# Patient Record
Sex: FEMALE | Race: WHITE | NOT HISPANIC OR LATINO | Employment: FULL TIME | RURAL
[De-identification: names, ages, dates, MRNs, and addresses within clinical notes are randomized per-mention and may not be internally consistent; named-entity substitution may affect disease eponyms.]

---

## 2019-11-05 ENCOUNTER — HISTORICAL (OUTPATIENT)
Dept: ADMINISTRATIVE | Facility: HOSPITAL | Age: 24
End: 2019-11-05

## 2019-11-06 LAB
LAB AP CLINICAL INFORMATION: NORMAL
LAB AP COMMENTS: NORMAL
LAB AP GENERAL CAT - HISTORICAL: NORMAL
LAB AP INTERPRETATION/RESULT - HISTORICAL: NEGATIVE
LAB AP SPECIMEN ADEQUACY - HISTORICAL: NORMAL
LAB AP SPECIMEN SUBMITTED - HISTORICAL: NORMAL

## 2022-06-28 ENCOUNTER — OFFICE VISIT (OUTPATIENT)
Dept: OBSTETRICS AND GYNECOLOGY | Facility: CLINIC | Age: 27
End: 2022-06-28
Payer: COMMERCIAL

## 2022-06-28 VITALS
SYSTOLIC BLOOD PRESSURE: 134 MMHG | DIASTOLIC BLOOD PRESSURE: 86 MMHG | WEIGHT: 187.81 LBS | HEART RATE: 80 BPM | HEIGHT: 64 IN | BODY MASS INDEX: 32.06 KG/M2 | OXYGEN SATURATION: 98 %

## 2022-06-28 DIAGNOSIS — N93.0 POSTCOITAL AND CONTACT BLEEDING: Primary | ICD-10-CM

## 2022-06-28 DIAGNOSIS — Z30.431 INTRAUTERINE DEVICE SURVEILLANCE: ICD-10-CM

## 2022-06-28 DIAGNOSIS — Z12.4 SCREENING FOR CERVICAL CANCER: ICD-10-CM

## 2022-06-28 LAB
CANDIDA SPECIES: NEGATIVE
GARDNERELLA: POSITIVE
TRICHOMONAS: NEGATIVE

## 2022-06-28 PROCEDURE — 88142 CYTOPATH C/V THIN LAYER: CPT | Mod: GCY | Performed by: ADVANCED PRACTICE MIDWIFE

## 2022-06-28 PROCEDURE — 99213 OFFICE O/P EST LOW 20 MIN: CPT | Mod: ,,, | Performed by: ADVANCED PRACTICE MIDWIFE

## 2022-06-28 PROCEDURE — 87491 CHLAMYDIA/GONORRHOEAE(GC), PCR: ICD-10-PCS | Mod: ,,, | Performed by: CLINICAL MEDICAL LABORATORY

## 2022-06-28 PROCEDURE — 87591 CHLAMYDIA/GONORRHOEAE(GC), PCR: ICD-10-PCS | Mod: ,,, | Performed by: CLINICAL MEDICAL LABORATORY

## 2022-06-28 PROCEDURE — 87660 BACTERIAL VAGINOSIS: ICD-10-PCS | Mod: ,,, | Performed by: CLINICAL MEDICAL LABORATORY

## 2022-06-28 PROCEDURE — 87510 BACTERIAL VAGINOSIS: ICD-10-PCS | Mod: ,,, | Performed by: CLINICAL MEDICAL LABORATORY

## 2022-06-28 PROCEDURE — 87591 N.GONORRHOEAE DNA AMP PROB: CPT | Mod: ,,, | Performed by: CLINICAL MEDICAL LABORATORY

## 2022-06-28 PROCEDURE — 87480 BACTERIAL VAGINOSIS: ICD-10-PCS | Mod: ,,, | Performed by: CLINICAL MEDICAL LABORATORY

## 2022-06-28 PROCEDURE — 99213 PR OFFICE/OUTPT VISIT, EST, LEVL III, 20-29 MIN: ICD-10-PCS | Mod: ,,, | Performed by: ADVANCED PRACTICE MIDWIFE

## 2022-06-28 PROCEDURE — 87510 GARDNER VAG DNA DIR PROBE: CPT | Mod: ,,, | Performed by: CLINICAL MEDICAL LABORATORY

## 2022-06-28 PROCEDURE — 87480 CANDIDA DNA DIR PROBE: CPT | Mod: ,,, | Performed by: CLINICAL MEDICAL LABORATORY

## 2022-06-28 PROCEDURE — 87491 CHLMYD TRACH DNA AMP PROBE: CPT | Mod: ,,, | Performed by: CLINICAL MEDICAL LABORATORY

## 2022-06-28 PROCEDURE — 87660 TRICHOMONAS VAGIN DIR PROBE: CPT | Mod: ,,, | Performed by: CLINICAL MEDICAL LABORATORY

## 2022-06-28 NOTE — PROGRESS NOTES
Subjective:       Patient ID: Ashlyn Foster is a 26 y.o. female.    Chief Complaint: Vaginal Bleeding (Patient states after sex she bleeds and cramps and thinks the iud is out of place. Consuelo gaspar placed the iud. /Pap: Jan 2018)    HPI  Ms Cortés is having bleeding after intercouse.  This weekend she started to bleed more than usual after sex on 6/24/22 and had severe cramps.  Today the bleeding is light.  The Mirina was inserted 1/23/2018 by Consuelo Lock.  Review of Systems   Constitutional: Negative.    HENT: Negative.    Respiratory: Negative.    Cardiovascular: Negative.    Gastrointestinal: Negative.    Genitourinary: Positive for vaginal bleeding.   Neurological: Negative.    Psychiatric/Behavioral: Negative.             Objective:      Physical Exam  Constitutional:       Appearance: She is normal weight.   HENT:      Head: Normocephalic.      Nose: Nose normal.   Eyes:      Extraocular Movements: Extraocular movements intact.   Cardiovascular:      Rate and Rhythm: Normal rate.   Pulmonary:      Effort: Pulmonary effort is normal.   Abdominal:      General: Abdomen is flat.      Palpations: Abdomen is soft.   Genitourinary:     General: Normal vulva.      Vagina: No vaginal discharge.      Comments: I can see the IUD string.  There is a pink discharge.  I collected a pap and there was blood on the spatula.  I do not see the IUD.just the string.  Neg for CMT.  Cervix is inflammed. On each side of the os.  Musculoskeletal:         General: Normal range of motion.   Skin:     General: Skin is warm and dry.   Neurological:      Mental Status: She is alert and oriented to person, place, and time.   Psychiatric:         Mood and Affect: Mood normal.         Behavior: Behavior normal.         Assessment:       Problem List Items Addressed This Visit    None     Visit Diagnoses     Postcoital and contact bleeding    -  Primary    Relevant Orders    Chlamydia/GC, PCR    Bacterial Vaginosis    Screening  for cervical cancer        Relevant Orders    ThinPrep Pap Test    Intrauterine device surveillance              Plan:     Call result of testing, f/u in two weeks.  She may need a pelvic u/s.       Equal and normal pulses (carotid, femoral, dorsalis pedis)

## 2022-06-29 LAB
CHLAMYDIA BY PCR: NEGATIVE
N. GONORRHOEAE (GC) BY PCR: NEGATIVE

## 2022-06-30 ENCOUNTER — TELEPHONE (OUTPATIENT)
Dept: OBSTETRICS AND GYNECOLOGY | Facility: CLINIC | Age: 27
End: 2022-06-30
Payer: COMMERCIAL

## 2022-06-30 DIAGNOSIS — N76.0 BACTERIAL VAGINOSIS: Primary | ICD-10-CM

## 2022-06-30 DIAGNOSIS — B96.89 BACTERIAL VAGINOSIS: Primary | ICD-10-CM

## 2022-06-30 RX ORDER — METRONIDAZOLE 500 MG/1
500 TABLET ORAL 2 TIMES DAILY
Qty: 14 TABLET | Refills: 0 | Status: SHIPPED | OUTPATIENT
Start: 2022-06-30 | End: 2022-07-07

## 2022-06-30 NOTE — TELEPHONE ENCOUNTER
----- Message from Anisa Verdin CNM sent at 6/30/2022 10:06 AM CDT -----  Review with pt.as ANSELMO and I sent Flagyl

## 2022-07-05 LAB
GH SERPL-MCNC: NORMAL NG/ML
INSULIN SERPL-ACNC: NORMAL U[IU]/ML
LAB AP CLINICAL INFORMATION: NORMAL
LAB AP GYN INTERPRETATION: NEGATIVE
LAB AP PAP DISCLAIMER COMMENTS: NORMAL
RENIN PLAS-CCNC: NORMAL NG/ML/H

## 2022-07-07 ENCOUNTER — TELEPHONE (OUTPATIENT)
Dept: OBSTETRICS AND GYNECOLOGY | Facility: CLINIC | Age: 27
End: 2022-07-07
Payer: COMMERCIAL

## 2022-07-07 NOTE — TELEPHONE ENCOUNTER
----- Message from Anisa Verdin CNM sent at 7/7/2022  8:57 AM CDT -----  Review with pt.as normal pap and to repeat 3 years but get annual exam.

## 2022-07-12 ENCOUNTER — OFFICE VISIT (OUTPATIENT)
Dept: OBSTETRICS AND GYNECOLOGY | Facility: CLINIC | Age: 27
End: 2022-07-12
Payer: COMMERCIAL

## 2022-07-12 VITALS
DIASTOLIC BLOOD PRESSURE: 77 MMHG | SYSTOLIC BLOOD PRESSURE: 122 MMHG | HEIGHT: 64 IN | BODY MASS INDEX: 32.06 KG/M2 | HEART RATE: 73 BPM | WEIGHT: 187.81 LBS | OXYGEN SATURATION: 98 %

## 2022-07-12 DIAGNOSIS — Z30.432 ENCOUNTER FOR REMOVAL OF INTRAUTERINE CONTRACEPTIVE DEVICE (IUD): ICD-10-CM

## 2022-07-12 DIAGNOSIS — N93.0 POSTCOITAL AND CONTACT BLEEDING: Primary | ICD-10-CM

## 2022-07-12 PROCEDURE — 99212 PR OFFICE/OUTPT VISIT, EST, LEVL II, 10-19 MIN: ICD-10-PCS | Mod: 25,,, | Performed by: ADVANCED PRACTICE MIDWIFE

## 2022-07-12 PROCEDURE — 58301 PR REMOVE, INTRAUTERINE DEVICE: ICD-10-PCS | Mod: ,,, | Performed by: ADVANCED PRACTICE MIDWIFE

## 2022-07-12 PROCEDURE — 99212 OFFICE O/P EST SF 10 MIN: CPT | Mod: 25,,, | Performed by: ADVANCED PRACTICE MIDWIFE

## 2022-07-12 PROCEDURE — 58301 REMOVE INTRAUTERINE DEVICE: CPT | Mod: ,,, | Performed by: ADVANCED PRACTICE MIDWIFE

## 2022-07-12 NOTE — PROGRESS NOTES
Subjective:       Patient ID: Ashlyn Foster is a 26 y.o. female.    Chief Complaint: Vaginal Bleeding and Cramping    HPI  Ashlyn was seen a month ago and was c/o postcoital bleeding.   She has a Paraguard and it appeared in place correctly.   I did a pap and it was normal.   She was treated for BV.  She is still having postcoital bleeding and cramping.   We decided to remove the IUD.   Review of Systems   Constitutional: Negative.    HENT: Negative.    Gastrointestinal: Negative.    Genitourinary: Positive for pelvic pain and vaginal bleeding. Negative for hematuria.   Musculoskeletal: Negative.    Integumentary:  Negative.   Neurological: Negative.    Psychiatric/Behavioral: Negative.          Objective:      Physical Exam  Constitutional:       Appearance: Normal appearance. She is obese.   HENT:      Head: Normocephalic.   Cardiovascular:      Rate and Rhythm: Normal rate.   Pulmonary:      Effort: Pulmonary effort is normal.   Abdominal:      Tenderness: There is no abdominal tenderness.   Genitourinary:     General: Normal vulva.      Cervix: Friability present.            Comments: I inserted a speculum and the IUD string was visualized.  A ring forcep was used to grasp the string and remove the IUD.  It was intact and shown to the patient.  She tolerated the procedure well.  Neurological:      Mental Status: She is alert.         Assessment:       Problem List Items Addressed This Visit    None     Visit Diagnoses     Postcoital and contact bleeding    -  Primary    Relevant Orders    US Pelvis Complete Non OB          Plan:     Use condoms for BC    Info on Paraguard    Call me in 3 weeks to update me on postcoital bleeding    Pelvic ultrasound.

## 2022-07-21 DIAGNOSIS — Z30.431 INTRAUTERINE DEVICE SURVEILLANCE: Primary | ICD-10-CM

## 2022-08-09 ENCOUNTER — OFFICE VISIT (OUTPATIENT)
Dept: OBSTETRICS AND GYNECOLOGY | Facility: CLINIC | Age: 27
End: 2022-08-09
Payer: COMMERCIAL

## 2022-08-09 VITALS
HEART RATE: 74 BPM | SYSTOLIC BLOOD PRESSURE: 114 MMHG | HEIGHT: 64 IN | BODY MASS INDEX: 32.61 KG/M2 | DIASTOLIC BLOOD PRESSURE: 76 MMHG | OXYGEN SATURATION: 98 % | WEIGHT: 191 LBS

## 2022-08-09 DIAGNOSIS — Z30.09 ENCOUNTER FOR OTHER GENERAL COUNSELING AND ADVICE ON CONTRACEPTION: Primary | ICD-10-CM

## 2022-08-09 PROCEDURE — 99212 PR OFFICE/OUTPT VISIT, EST, LEVL II, 10-19 MIN: ICD-10-PCS | Mod: ,,, | Performed by: ADVANCED PRACTICE MIDWIFE

## 2022-08-09 PROCEDURE — 99212 OFFICE O/P EST SF 10 MIN: CPT | Mod: ,,, | Performed by: ADVANCED PRACTICE MIDWIFE

## 2022-08-09 NOTE — PROGRESS NOTES
Subjective:       Patient ID: Ashlyn Foster is a 26 y.o. female.    Chief Complaint: Contraception (Patient states she is nervous about the paragard. Would like to talk about other options. )    HPI  Ms Foster says she has not had any postcooital bleeding since her Mirena was removed.  She is suppose to have a ParaGuard inserted Friday but is considering waiting longer.  She wants to know what other methods are available that are not hormonal.  She wants to see what her body is like without added hormones or an IUD.  I reviewed methods such as condoms, Phexxi, and also Annovera.  She wants to continue to use condoms for the time being and will think about a BTL because she does not want more children.  Review of Systems   Genitourinary: Negative for menstrual problem and vaginal bleeding.   Psychiatric/Behavioral: Negative.    All other systems reviewed and are negative.        Objective:      Physical Exam  Constitutional:       Appearance: She is obese.   HENT:      Head: Normocephalic.      Nose: Nose normal.   Eyes:      Extraocular Movements: Extraocular movements intact.   Cardiovascular:      Rate and Rhythm: Normal rate.   Pulmonary:      Effort: Pulmonary effort is normal.   Abdominal:      General: Abdomen is flat.      Palpations: Abdomen is soft.   Skin:     General: Skin is warm and dry.   Neurological:      Mental Status: She is alert and oriented to person, place, and time.   Psychiatric:         Mood and Affect: Mood normal.         Behavior: Behavior normal.         Assessment:       Problem List Items Addressed This Visit    None         Plan:     Cancel Paraguard insert    Continue condoms use for BC.    Let me know if she wants a BTL.

## 2023-12-20 DIAGNOSIS — I82.90 BLOOD CLOT IN VEIN: Primary | ICD-10-CM

## 2023-12-21 ENCOUNTER — HOSPITAL ENCOUNTER (EMERGENCY)
Facility: HOSPITAL | Age: 28
Discharge: HOME | End: 2023-12-21
Payer: COMMERCIAL

## 2023-12-21 VITALS
BODY MASS INDEX: 26.84 KG/M2 | DIASTOLIC BLOOD PRESSURE: 89 MMHG | HEIGHT: 67 IN | SYSTOLIC BLOOD PRESSURE: 123 MMHG | HEART RATE: 88 BPM | WEIGHT: 171 LBS | RESPIRATION RATE: 20 BRPM | TEMPERATURE: 99 F | OXYGEN SATURATION: 99 %

## 2023-12-21 DIAGNOSIS — R58 ECCHYMOSIS: Primary | ICD-10-CM

## 2023-12-21 DIAGNOSIS — M79.606 LEG PAIN: ICD-10-CM

## 2023-12-21 LAB
BASOPHILS # BLD AUTO: 0.04 K/UL (ref 0–0.2)
BASOPHILS NFR BLD AUTO: 0.7 % (ref 0–1)
DIFFERENTIAL METHOD BLD: ABNORMAL
EOSINOPHIL # BLD AUTO: 0.03 K/UL (ref 0–0.5)
EOSINOPHIL NFR BLD AUTO: 0.5 % (ref 1–4)
ERYTHROCYTE [DISTWIDTH] IN BLOOD BY AUTOMATED COUNT: 12 % (ref 11.5–14.5)
HCT VFR BLD AUTO: 41.9 % (ref 38–47)
HGB BLD-MCNC: 13.9 G/DL (ref 12–16)
IMM GRANULOCYTES # BLD AUTO: 0.01 K/UL (ref 0–0.04)
IMM GRANULOCYTES NFR BLD: 0.2 % (ref 0–0.4)
LYMPHOCYTES # BLD AUTO: 1.98 K/UL (ref 1–4.8)
LYMPHOCYTES NFR BLD AUTO: 32.6 % (ref 27–41)
MCH RBC QN AUTO: 29.9 PG (ref 27–31)
MCHC RBC AUTO-ENTMCNC: 33.2 G/DL (ref 32–36)
MCV RBC AUTO: 90.1 FL (ref 80–96)
MONOCYTES # BLD AUTO: 0.33 K/UL (ref 0–0.8)
MONOCYTES NFR BLD AUTO: 5.4 % (ref 2–6)
MPC BLD CALC-MCNC: 10.9 FL (ref 9.4–12.4)
NEUTROPHILS # BLD AUTO: 3.69 K/UL (ref 1.8–7.7)
NEUTROPHILS NFR BLD AUTO: 60.6 % (ref 53–65)
NRBC # BLD AUTO: 0 X10E3/UL
NRBC, AUTO (.00): 0 %
PLATELET # BLD AUTO: 293 K/UL (ref 150–400)
RBC # BLD AUTO: 4.65 M/UL (ref 4.2–5.4)
WBC # BLD AUTO: 6.08 K/UL (ref 4.5–11)

## 2023-12-21 PROCEDURE — 85025 COMPLETE CBC W/AUTO DIFF WBC: CPT | Performed by: NURSE PRACTITIONER

## 2023-12-21 PROCEDURE — 99284 EMERGENCY DEPT VISIT MOD MDM: CPT | Mod: 25

## 2023-12-21 PROCEDURE — 99283 PR EMERGENCY DEPT VISIT,LEVEL III: ICD-10-PCS | Mod: ,,, | Performed by: NURSE PRACTITIONER

## 2023-12-21 PROCEDURE — 99283 EMERGENCY DEPT VISIT LOW MDM: CPT | Mod: ,,, | Performed by: NURSE PRACTITIONER

## 2023-12-21 NOTE — ED PROVIDER NOTES
Encounter Date: 12/21/2023       History     Chief Complaint   Patient presents with    Leg Pain    Leg Swelling      28-year-old female presents to the emergency department to be evaluated for bruising on her bilateral lower legs.  She said that she was evaluated in clinic for this yesterday after she noticed it.  She had some labs done.  She had a venous Doppler ordered, but she came to the emergency department to have it done because it is going to be a couple of weeks before she can have it done otherwise.  Denies any chest pain or shortness of breath.  Denies any recent fall or injury    The history is provided by the patient.   Leg Pain   There was no injury mechanism. The incident occurred yesterday. Pertinent negatives include no numbness, no inability to bear weight, no loss of motion, no muscle weakness, no loss of sensation and no tingling.     Review of patient's allergies indicates:  No Known Allergies  No past medical history on file.  No past surgical history on file.  No family history on file.  Social History     Tobacco Use    Smoking status: Never    Smokeless tobacco: Never   Substance Use Topics    Alcohol use: Never    Drug use: Never     Review of Systems   Respiratory:  Negative for chest tightness and shortness of breath.    Cardiovascular:  Negative for chest pain.   Neurological:  Negative for tingling and numbness.   All other systems reviewed and are negative.      Physical Exam     Initial Vitals [12/21/23 0953]   BP Pulse Resp Temp SpO2   123/89 88 20 98.9 °F (37.2 °C) 99 %      MAP       --         Physical Exam    Vitals reviewed.  Constitutional: She appears well-developed and well-nourished.   Neck: Neck supple.   Cardiovascular:  Normal rate and regular rhythm.           Pulmonary/Chest: Breath sounds normal.   Abdominal: Abdomen is soft. Bowel sounds are normal. There is no abdominal tenderness.   Musculoskeletal:         General: Tenderness (bilateral calf tenderness) present.  No edema. Normal range of motion.      Cervical back: Neck supple.     Neurological: She is alert and oriented to person, place, and time. She has normal strength. GCS score is 15. GCS eye subscore is 4. GCS verbal subscore is 5. GCS motor subscore is 6.   Skin: Skin is warm and dry. Capillary refill takes less than 2 seconds.    Ecchymosis to bilateral calves   Psychiatric: She has a normal mood and affect.         Medical Screening Exam   See Full Note    ED Course   Procedures  Labs Reviewed   CBC WITH DIFFERENTIAL - Abnormal; Notable for the following components:       Result Value    Eosinophils % 0.5 (*)     All other components within normal limits   CBC W/ AUTO DIFFERENTIAL    Narrative:     The following orders were created for panel order CBC auto differential.  Procedure                               Abnormality         Status                     ---------                               -----------         ------                     CBC with Differential[[id number]]        Abnormal            Final result                 Please view results for these tests on the individual orders.          Imaging Results              US Lower Extremity Veins Bilateral (Final result)  Result time 12/21/23 11:22:02      Final result by Jona Stein II, MD (12/21/23 11:22:02)                   Impression:      No evidence of deep venous thrombosis.    Ultrasound images stored and captured.      Electronically signed by: Jona Stein  Date:    12/21/2023  Time:    11:22               Narrative:    EXAMINATION:  US LOWER EXTREMITY VEINS BILATERAL    CLINICAL HISTORY:  Pain in leg, unspecified    TECHNIQUE:  Duplex and color flow Doppler and dynamic compression was performed of the bilateral lower extremity veins was performed.    COMPARISON:  None.    FINDINGS:  No evidence of echogenic, non-compressible thrombus seen in the visualized veins of the extremities.  Color Doppler venous waveform pattern is within normal  limits.                                       Medications - No data to display  Medical Decision Making   28-year-old female presents to the emergency department to be evaluated for bruising on her bilateral lower legs.  She said that she was evaluated in clinic for this yesterday after she noticed it.  She had some labs done.  She had a venous Doppler ordered, but she came to the emergency department to have it done because it is going to be a couple of weeks before she can have it done otherwise.  Denies any chest pain or shortness of breath.  Denies any recent fall or injury   Diagnosis: Leg pain, ecchymosis   Venous Doppler ordered, reviewed as well as radiologist's interpretation which was significant for no acute process   Labs ordered and reviewed    Amount and/or Complexity of Data Reviewed  Labs: ordered.                                      Clinical Impression:   Final diagnoses:  [M79.606] Leg pain  [R58] Ecchymosis (Primary)        ED Disposition Condition    Discharge Stable          ED Prescriptions    None       Follow-up Information    None          Harriet Veronica, BETO  12/21/23 9834

## 2023-12-21 NOTE — ED TRIAGE NOTES
Patient presents to ED with c/o bilateral leg bruising, pain and swelling that started yesterday.

## 2023-12-28 ENCOUNTER — OFFICE VISIT (OUTPATIENT)
Dept: VASCULAR SURGERY | Facility: CLINIC | Age: 28
End: 2023-12-28
Payer: COMMERCIAL

## 2023-12-28 VITALS — BODY MASS INDEX: 26.84 KG/M2 | HEIGHT: 67 IN | WEIGHT: 171 LBS

## 2023-12-28 DIAGNOSIS — R23.3 ABNORMAL BRUISING: Primary | ICD-10-CM

## 2023-12-28 PROCEDURE — 85610 PROTHROMBIN TIME: CPT | Performed by: SURGERY

## 2023-12-28 PROCEDURE — 99203 OFFICE O/P NEW LOW 30 MIN: CPT | Mod: S$PBB,,, | Performed by: SURGERY

## 2023-12-28 PROCEDURE — 85384 FIBRINOGEN ACTIVITY: CPT | Performed by: SURGERY

## 2023-12-28 PROCEDURE — 99203 PR OFFICE/OUTPT VISIT, NEW, LEVL III, 30-44 MIN: ICD-10-PCS | Mod: S$PBB,,, | Performed by: SURGERY

## 2023-12-28 PROCEDURE — 99213 OFFICE O/P EST LOW 20 MIN: CPT | Mod: PBBFAC | Performed by: SURGERY

## 2023-12-28 NOTE — PROGRESS NOTES
"Subjective:       Patient ID: Ashlyn Foster is a 28 y.o. female.    Chief Complaint: Consult (Bruising. Had c/o legs being tender)  Some new patient referred for evaluation of abnormal bruising.  She states it is bilateral inner aspect of the calves.  Denies any known trauma.  She notes that 8 days ago.  Never had any joint bleeding never had any bloody noses that would not stop bleeding no family history.  She did have grandparents  of cirrhosis but she states this was due to drinking and not genetic component.  She has been on antibiotics recently Augmentin.  She has taken semaglutide for weight loss.  She has on no hormone therapy  family history is not on file.  No past medical history on file.   No past surgical history on file.    reports that she has never smoked. She has never used smokeless tobacco. She reports that she does not drink alcohol and does not use drugs.   HPI  Review of Systems      Objective:      Ht 5' 7" (1.702 m)   Wt 77.6 kg (171 lb)   LMP 2023 (Approximate)   BMI 26.78 kg/m²    Physical Exam  Constitutional:       Appearance: Normal appearance.   HENT:      Head: Normocephalic and atraumatic.   Cardiovascular:      Rate and Rhythm: Normal rate.      Comments: Micro varicosities bilateral upper thighs  Abdominal:      General: Abdomen is flat.   Musculoskeletal:         General: Normal range of motion.      Comments: Joints are not abnormal free of effusions pain   Skin:     General: Skin is warm.      Capillary Refill: Capillary refill takes less than 2 seconds.      Comments: Proximally 4x4s cm areas of bruising involving the mid calves on the medial side of both legs   Neurological:      General: No focal deficit present.      Mental Status: She is alert.   Psychiatric:         Mood and Affect: Mood normal.         Behavior: Behavior normal.         Thought Content: Thought content normal.         Judgment: Judgment normal.           Assessment:       1. Abnormal bruising "        Plan:       We will check coagulation studies PT, PTT, CMP for liver function, fibrinogen,    Her platelets are normal hematocrit normal

## 2024-01-10 ENCOUNTER — OFFICE VISIT (OUTPATIENT)
Dept: VASCULAR SURGERY | Facility: CLINIC | Age: 29
End: 2024-01-10
Payer: COMMERCIAL

## 2024-01-10 VITALS — WEIGHT: 171 LBS | BODY MASS INDEX: 26.84 KG/M2 | HEIGHT: 67 IN

## 2024-01-10 DIAGNOSIS — R23.3 ABNORMAL BRUISING: Primary | ICD-10-CM

## 2024-01-10 DIAGNOSIS — I82.90 BLOOD CLOT IN VEIN: ICD-10-CM

## 2024-01-10 PROCEDURE — 99212 OFFICE O/P EST SF 10 MIN: CPT | Mod: S$PBB,,, | Performed by: SURGERY

## 2024-01-10 PROCEDURE — 99213 OFFICE O/P EST LOW 20 MIN: CPT | Mod: PBBFAC | Performed by: SURGERY

## 2024-01-10 NOTE — PROGRESS NOTES
"Subjective:       Patient ID: Ashlyn Foster is a 28 y.o. female.    Chief Complaint: Follow-up (F/u lab results )  Status patient follow-up for bruising    Her laboratory data without significant abnormality    He has had no other significant bruising episodes since last visit     We have offered her referral to Hematology she desires to sit tight this time she will call if she has entered the problems or issues  family history is not on file.  History reviewed. No pertinent past medical history.   History reviewed. No pertinent surgical history.    reports that she has never smoked. She has never used smokeless tobacco. She reports that she does not drink alcohol and does not use drugs.   HPI  Review of Systems      Objective:      Ht 5' 7" (1.702 m)   Wt 77.6 kg (171 lb)   LMP 12/20/2023 (Approximate)   BMI 26.78 kg/m²    Physical Exam  Constitutional:       Appearance: Normal appearance.   Pulmonary:      Effort: Pulmonary effort is normal.   Skin:     General: Skin is warm and dry.      Capillary Refill: Capillary refill takes less than 2 seconds.      Comments: No evidence of new bruising lower extremities arms face   Neurological:      General: No focal deficit present.      Mental Status: She is alert.   Psychiatric:         Mood and Affect: Mood normal.         Behavior: Behavior normal.         Thought Content: Thought content normal.         Judgment: Judgment normal.           Assessment:       1. Abnormal bruising        Plan:       Educated patient offered hematology referral desires sit tight this time we will call us if any other problems arise      "

## 2024-06-11 ENCOUNTER — OFFICE VISIT (OUTPATIENT)
Dept: OBSTETRICS AND GYNECOLOGY | Facility: CLINIC | Age: 29
End: 2024-06-11
Payer: COMMERCIAL

## 2024-06-11 VITALS
SYSTOLIC BLOOD PRESSURE: 123 MMHG | BODY MASS INDEX: 25.24 KG/M2 | OXYGEN SATURATION: 99 % | WEIGHT: 160.81 LBS | DIASTOLIC BLOOD PRESSURE: 80 MMHG | HEART RATE: 65 BPM | RESPIRATION RATE: 17 BRPM | HEIGHT: 67 IN

## 2024-06-11 DIAGNOSIS — N92.0 MENORRHAGIA WITH REGULAR CYCLE: ICD-10-CM

## 2024-06-11 DIAGNOSIS — R82.90 ABNORMAL URINE: ICD-10-CM

## 2024-06-11 DIAGNOSIS — R10.9 ABDOMINAL PAIN, UNSPECIFIED ABDOMINAL LOCATION: ICD-10-CM

## 2024-06-11 DIAGNOSIS — Z72.51 HIGH RISK HETEROSEXUAL BEHAVIOR: ICD-10-CM

## 2024-06-11 DIAGNOSIS — N93.0 POSTCOITAL AND CONTACT BLEEDING: Primary | ICD-10-CM

## 2024-06-11 LAB
B-HCG UR QL: NEGATIVE
BILIRUB SERPL-MCNC: NEGATIVE MG/DL
BLOOD URINE, POC: ABNORMAL
CANDIDA SPECIES: NEGATIVE
COLOR, POC UA: YELLOW
CTP QC/QA: YES
GARDNERELLA: POSITIVE
GLUCOSE UR QL STRIP: NEGATIVE
HCT VFR BLD AUTO: 43.9 % (ref 38–47)
HGB BLD-MCNC: 13.9 G/DL (ref 12–16)
KETONES UR QL STRIP: NEGATIVE
LEUKOCYTE ESTERASE URINE, POC: ABNORMAL
NITRITE, POC UA: NEGATIVE
PH, POC UA: 6
PROTEIN, POC: NEGATIVE
SPECIFIC GRAVITY, POC UA: 1.03
TRICHOMONAS: NEGATIVE
UROBILINOGEN, POC UA: 0.2

## 2024-06-11 PROCEDURE — 81003 URINALYSIS AUTO W/O SCOPE: CPT | Mod: QW,,, | Performed by: ADVANCED PRACTICE MIDWIFE

## 2024-06-11 PROCEDURE — 87480 CANDIDA DNA DIR PROBE: CPT | Mod: ,,, | Performed by: CLINICAL MEDICAL LABORATORY

## 2024-06-11 PROCEDURE — 99213 OFFICE O/P EST LOW 20 MIN: CPT | Mod: ,,, | Performed by: ADVANCED PRACTICE MIDWIFE

## 2024-06-11 PROCEDURE — 85018 HEMOGLOBIN: CPT | Mod: ,,, | Performed by: CLINICAL MEDICAL LABORATORY

## 2024-06-11 PROCEDURE — 87510 GARDNER VAG DNA DIR PROBE: CPT | Mod: ,,, | Performed by: CLINICAL MEDICAL LABORATORY

## 2024-06-11 PROCEDURE — 85014 HEMATOCRIT: CPT | Mod: ,,, | Performed by: CLINICAL MEDICAL LABORATORY

## 2024-06-11 PROCEDURE — 87491 CHLMYD TRACH DNA AMP PROBE: CPT | Mod: ,,, | Performed by: CLINICAL MEDICAL LABORATORY

## 2024-06-11 PROCEDURE — 87660 TRICHOMONAS VAGIN DIR PROBE: CPT | Mod: ,,, | Performed by: CLINICAL MEDICAL LABORATORY

## 2024-06-11 PROCEDURE — 87591 N.GONORRHOEAE DNA AMP PROB: CPT | Mod: ,,, | Performed by: CLINICAL MEDICAL LABORATORY

## 2024-06-11 PROCEDURE — 81025 URINE PREGNANCY TEST: CPT | Mod: QW,,, | Performed by: ADVANCED PRACTICE MIDWIFE

## 2024-06-11 RX ORDER — NORETHINDRONE ACETATE AND ETHINYL ESTRADIOL 1MG-20(21)
1 KIT ORAL DAILY
Qty: 30 TABLET | Refills: 11 | Status: SHIPPED | OUTPATIENT
Start: 2024-06-11 | End: 2025-06-11

## 2024-06-12 ENCOUNTER — PATIENT MESSAGE (OUTPATIENT)
Dept: OBSTETRICS AND GYNECOLOGY | Facility: CLINIC | Age: 29
End: 2024-06-12
Payer: COMMERCIAL

## 2024-06-12 DIAGNOSIS — B96.89 BACTERIAL VAGINOSIS: Primary | ICD-10-CM

## 2024-06-12 DIAGNOSIS — N76.0 BACTERIAL VAGINOSIS: Primary | ICD-10-CM

## 2024-06-12 DIAGNOSIS — R82.90 ABNORMAL URINE: Primary | ICD-10-CM

## 2024-06-12 LAB
CHLAMYDIA BY PCR: NEGATIVE
N. GONORRHOEAE (GC) BY PCR: NEGATIVE

## 2024-06-12 PROCEDURE — 87086 URINE CULTURE/COLONY COUNT: CPT | Mod: ,,, | Performed by: CLINICAL MEDICAL LABORATORY

## 2024-06-12 RX ORDER — METRONIDAZOLE 500 MG/1
500 TABLET ORAL 2 TIMES DAILY
Qty: 14 TABLET | Refills: 0 | Status: SHIPPED | OUTPATIENT
Start: 2024-06-12 | End: 2024-06-19

## 2024-06-12 NOTE — PROGRESS NOTES
"Patient ID:  Ashlyn Foster is a 28 y.o. female      Chief Complaint: Crampy abdominal pain and bleeding after sex  Chief Complaint   Patient presents with    Abdominal Pain    Vaginal Bleeding     Patient says her cycles are lasting 2 weeks and she is also having lower abdominal pain that has been lasting 3 weeks or so, ibprofen helps a little        HPI: Ms Foster went to the ER on 12/21/2024 because she had large bruises on her legs without any trauma to them.  She saw Dr. Murphy and he did a clotting work up and it was normal.  The bruising has stopped but she is now having postcoital bleeding  She had that one year ago and it stopped after she had her IUD removed.  She has cramps like her period is starting.  Her period last two weeks.  LMP: Patient's last menstrual period was 05/20/2024.   Sexually active:  yes  Contraceptive:none      No past medical history on file.  No past surgical history on file.    OB History    No obstetric history on file.         /80 (BP Location: Right arm, Patient Position: Sitting)   Pulse 65   Resp 17   Ht 5' 7" (1.702 m)   Wt 72.9 kg (160 lb 12.8 oz)   LMP 05/20/2024   SpO2 99%   BMI 25.18 kg/m²   Wt Readings from Last 3 Encounters:   06/11/24 72.9 kg (160 lb 12.8 oz)   01/10/24 77.6 kg (171 lb)   12/28/23 77.6 kg (171 lb)          ROS:  Review of Systems   Constitutional: Negative.    HENT: Negative.     Eyes: Negative.    Respiratory: Negative.     Cardiovascular: Negative.    Gastrointestinal: Negative.    Endocrine: Negative.    Genitourinary:  Positive for menorrhagia and postcoital bleeding.   Musculoskeletal: Negative.    Integumentary:  Negative.   Neurological: Negative.    Psychiatric/Behavioral: Negative.     Breast: negative.         PHYSICAL EXAM:  Physical Exam  Vitals and nursing note reviewed.   Constitutional:       General: She is not in acute distress.     Appearance: Normal appearance. She is normal weight. She is not ill-appearing.   HENT:      " Head: Normocephalic.   Eyes:      Extraocular Movements: Extraocular movements intact.   Cardiovascular:      Rate and Rhythm: Normal rate.      Pulses: Normal pulses.   Pulmonary:      Effort: Pulmonary effort is normal.   Genitourinary:     Comments: A speculum exam was done and no  active bleeding is noted.  Bimanual exam is normal.  No cervical polyps are seen.  Musculoskeletal:         General: Normal range of motion.      Cervical back: Normal range of motion.   Skin:     General: Skin is warm and dry.   Neurological:      Mental Status: She is alert and oriented to person, place, and time.   Psychiatric:         Mood and Affect: Mood normal.         Behavior: Behavior normal.         Thought Content: Thought content normal.         Judgment: Judgment normal.        Assessment:  Ashlyn was seen today for abdominal pain and vaginal bleeding.    Diagnoses and all orders for this visit:    Abdominal pain, unspecified abdominal location  -     POCT URINALYSIS W/O SCOPE  -     US Pelvis Complete Non OB; Future    High risk heterosexual behavior  -     POCT urine pregnancy    Postcoital and contact bleeding  -     US Pelvis Complete Non OB; Future  -     Bacterial Vaginosis  -     Chlamydia/GC, PCR; Future  -     Chlamydia/GC, PCR    Menorrhagia with regular cycle  -     Hemoglobin and Hematocrit; Future  -     US Pelvis Complete Non OB; Future  -     norethindrone-ethinyl estradiol (JUNEL FE 1/20) 1 mg-20 mcg (21)/75 mg (7) per tablet; Take 1 tablet by mouth once daily.  -     Hemoglobin and Hematocrit    Abnormal urine  -     Urine culture          ICD-10-CM ICD-9-CM    1. Abdominal pain, unspecified abdominal location  R10.9 789.00 POCT URINALYSIS W/O SCOPE      US Pelvis Complete Non OB      2. High risk heterosexual behavior  Z72.51 V69.2 POCT urine pregnancy      3. Postcoital and contact bleeding  N93.0 626.7 US Pelvis Complete Non OB      Bacterial Vaginosis      Chlamydia/GC, PCR      Chlamydia/GC, PCR       4. Menorrhagia with regular cycle  N92.0 626.2 Hemoglobin and Hematocrit      US Pelvis Complete Non OB      norethindrone-ethinyl estradiol (JUNEL FE 1/20) 1 mg-20 mcg (21)/75 mg (7) per tablet      Hemoglobin and Hematocrit      5. Abnormal urine  R82.90 791.9 Urine culture          Plan:Pelvic u/s  H & H  Start Junel FE 1/20 to decrease bleeding  She declined a referral to the hematologist.  I encouraged her to think about it because I am concerned she may have a clotting disorder      Follow up in about 2 months (around 8/11/2024).

## 2024-06-14 ENCOUNTER — PATIENT MESSAGE (OUTPATIENT)
Dept: OBSTETRICS AND GYNECOLOGY | Facility: CLINIC | Age: 29
End: 2024-06-14
Payer: COMMERCIAL

## 2024-06-14 LAB — UA COMPLETE W REFLEX CULTURE PNL UR: NO GROWTH
